# Patient Record
Sex: FEMALE | Race: BLACK OR AFRICAN AMERICAN | NOT HISPANIC OR LATINO | Employment: FULL TIME | URBAN - METROPOLITAN AREA
[De-identification: names, ages, dates, MRNs, and addresses within clinical notes are randomized per-mention and may not be internally consistent; named-entity substitution may affect disease eponyms.]

---

## 2024-06-07 ENCOUNTER — HOSPITAL ENCOUNTER (EMERGENCY)
Facility: HOSPITAL | Age: 24
Discharge: HOME | End: 2024-06-07
Payer: COMMERCIAL

## 2024-06-07 VITALS
RESPIRATION RATE: 16 BRPM | HEART RATE: 75 BPM | SYSTOLIC BLOOD PRESSURE: 129 MMHG | OXYGEN SATURATION: 98 % | DIASTOLIC BLOOD PRESSURE: 66 MMHG | TEMPERATURE: 96.4 F

## 2024-06-07 DIAGNOSIS — K02.9 PAIN DUE TO DENTAL CARIES: Primary | ICD-10-CM

## 2024-06-07 PROCEDURE — 2500000001 HC RX 250 WO HCPCS SELF ADMINISTERED DRUGS (ALT 637 FOR MEDICARE OP)

## 2024-06-07 PROCEDURE — 99283 EMERGENCY DEPT VISIT LOW MDM: CPT

## 2024-06-07 RX ORDER — AMOXICILLIN AND CLAVULANATE POTASSIUM 875; 125 MG/1; MG/1
1 TABLET, FILM COATED ORAL EVERY 12 HOURS
Qty: 20 TABLET | Refills: 0 | Status: SHIPPED | OUTPATIENT
Start: 2024-06-07 | End: 2024-06-17

## 2024-06-07 RX ORDER — AMOXICILLIN AND CLAVULANATE POTASSIUM 875; 125 MG/1; MG/1
1 TABLET, FILM COATED ORAL ONCE
Status: COMPLETED | OUTPATIENT
Start: 2024-06-07 | End: 2024-06-07

## 2024-06-07 RX ORDER — TRAMADOL HYDROCHLORIDE 50 MG/1
50 TABLET ORAL ONCE
Status: COMPLETED | OUTPATIENT
Start: 2024-06-07 | End: 2024-06-07

## 2024-06-07 RX ORDER — TRAMADOL HYDROCHLORIDE 50 MG/1
50 TABLET ORAL EVERY 6 HOURS PRN
Qty: 5 TABLET | Refills: 0 | Status: SHIPPED | OUTPATIENT
Start: 2024-06-07 | End: 2024-06-10

## 2024-06-07 RX ADMIN — TRAMADOL HYDROCHLORIDE 50 MG: 50 TABLET ORAL at 22:49

## 2024-06-07 RX ADMIN — AMOXICILLIN AND CLAVULANATE POTASSIUM 1 TABLET: 875; 125 TABLET, FILM COATED ORAL at 22:49

## 2024-06-07 ASSESSMENT — COLUMBIA-SUICIDE SEVERITY RATING SCALE - C-SSRS
1. IN THE PAST MONTH, HAVE YOU WISHED YOU WERE DEAD OR WISHED YOU COULD GO TO SLEEP AND NOT WAKE UP?: NO
6. HAVE YOU EVER DONE ANYTHING, STARTED TO DO ANYTHING, OR PREPARED TO DO ANYTHING TO END YOUR LIFE?: NO
2. HAVE YOU ACTUALLY HAD ANY THOUGHTS OF KILLING YOURSELF?: NO

## 2024-06-07 ASSESSMENT — LIFESTYLE VARIABLES
HAVE PEOPLE ANNOYED YOU BY CRITICIZING YOUR DRINKING: NO
TOTAL SCORE: 0
HAVE YOU EVER FELT YOU SHOULD CUT DOWN ON YOUR DRINKING: NO
EVER FELT BAD OR GUILTY ABOUT YOUR DRINKING: NO
EVER HAD A DRINK FIRST THING IN THE MORNING TO STEADY YOUR NERVES TO GET RID OF A HANGOVER: NO

## 2024-06-07 ASSESSMENT — PAIN DESCRIPTION - ORIENTATION: ORIENTATION: LEFT;LOWER

## 2024-06-07 ASSESSMENT — PAIN DESCRIPTION - LOCATION: LOCATION: TEETH

## 2024-06-07 ASSESSMENT — PAIN - FUNCTIONAL ASSESSMENT: PAIN_FUNCTIONAL_ASSESSMENT: 0-10

## 2024-06-07 ASSESSMENT — PAIN SCALES - GENERAL
PAINLEVEL_OUTOF10: 10 - WORST POSSIBLE PAIN
PAINLEVEL_OUTOF10: 9

## 2024-06-07 NOTE — Clinical Note
Denise Nassar was seen and treated in our emergency department on 6/7/2024.  She may return to work on 06/10/2024.       If you have any questions or concerns, please don't hesitate to call.      Gerry Cervantes PA-C

## 2024-06-08 NOTE — ED NOTES
Pt c/o left lower dental pain x 2 wks, states initially tolerable and OTC meds were helping with the pain, but today significantly worsened.  D.c. papers and scrips x 2 given. Work note given. Pt advised on dental care, follow up with her dentist, has appointment for 6/18, advised to call in the morning to see if she can get in sooner. Advised on proper way to take medications. Return should symptoms change/worsen in anyway. Gait steady to lobby.      Rafael Black RN  06/07/24 1205

## 2024-06-08 NOTE — ED PROVIDER NOTES
HPI   Chief Complaint   Patient presents with    Dental Pain     Has had pain for the past year. Pain got intense tonight       Patient is a 23-year-old female with no significant past med history presenting with dental pain.  States that she has been suffering from the left mandibular cracked tooth and dental carry for roughly 1 year.  States she has had this evaluated by dentist several weeks ago but she is currently unable to afford the procedures to get this fixed.  States that she has been having pain for several days and today the pain became much more intense.  States she has tried over-the-counter medications without success.  Patient denies fevers, chills, cough, sore throat, runny nose, chest pain, shortness of breath, abdominal pain, nausea, vomiting, diarrhea or urinary complaints.                          Ferris Coma Scale Score: 15                     Patient History   History reviewed. No pertinent past medical history.  History reviewed. No pertinent surgical history.  No family history on file.  Social History     Tobacco Use    Smoking status: Not on file    Smokeless tobacco: Not on file   Substance Use Topics    Alcohol use: Not on file    Drug use: Not on file       Physical Exam   ED Triage Vitals [06/07/24 2206]   Temperature Heart Rate Respirations BP   35.8 °C (96.4 °F) 75 16 129/66      Pulse Ox Temp Source Heart Rate Source Patient Position   98 % Temporal -- --      BP Location FiO2 (%)     Right arm --       Physical Exam  Vitals and nursing note reviewed.   Constitutional:       Appearance: She is well-developed.      Comments: Awake, sitting in examination chair   HENT:      Head: Normocephalic and atraumatic.      Nose: Nose normal.      Mouth/Throat:      Mouth: Mucous membranes are moist.      Comments: Poor dentition noted throughout with posterior left mandibular molar that is cracked in the adjacent tooth with significant dental carry  Eyes:      Extraocular Movements:  Extraocular movements intact.      Conjunctiva/sclera: Conjunctivae normal.      Pupils: Pupils are equal, round, and reactive to light.   Cardiovascular:      Rate and Rhythm: Normal rate and regular rhythm.      Pulses: Normal pulses.      Heart sounds: Normal heart sounds. No murmur heard.  Pulmonary:      Effort: Pulmonary effort is normal. No respiratory distress.      Breath sounds: Normal breath sounds.   Abdominal:      General: Abdomen is flat.      Palpations: Abdomen is soft.      Tenderness: There is no abdominal tenderness.   Musculoskeletal:         General: No swelling. Normal range of motion.      Cervical back: Normal range of motion and neck supple.   Skin:     General: Skin is warm and dry.      Capillary Refill: Capillary refill takes less than 2 seconds.   Neurological:      General: No focal deficit present.      Mental Status: She is alert and oriented to person, place, and time.   Psychiatric:         Mood and Affect: Mood normal.         Behavior: Behavior normal.         ED Course & MDM   Diagnoses as of 06/08/24 1020   Pain due to dental caries       Medical Decision Making  Patient is a 23-year-old female with no significant past medical history presenting with dental pain.  Conditions considered include but not limited to: Dental trauma, dental carry, tooth fracture, abscess, PTA, Miguelito angina.  PTA less likely due to no unilateral swelling, no stridor, no unilateral deviation of the trachea.  But again she is unlikely due to no dysphagia, no submandibular swelling, no respiratory compromise.    After physical exam there was noted to be multiple dental caries as well as left mandibular molar to be cracked.  Low concern for possible infection, antibiotic started.  Tramadol given for pain.  Discussed with this patient that she needs to follow-up with a dentist.  We did give on-call follow-up with dentistry as well as recommend she follow-up with her primary dentist.  I believe this  patient is at low risk for complication, and a disoposition of discharge is acceptable.  Return to the Emergency Department if new or worsening symptoms including headache, fever, chills, chest pain, shortness of breath, syncope, near syncope, abdominal pain, nausea, vomiting,  diarrhea, or worsening pain.  Patient will be sent home on antibiotics as well as a small dose of tramadol for pain control.  Patient is agreeable to disposition of discharge with follow-up with primary dentistry and primary care.    Portions of this note made with Dragon software, please be mindful of potential grammatical errors.        Medications   amoxicillin-pot clavulanate (Augmentin) 875-125 mg per tablet 1 tablet (1 tablet oral Given 6/7/24 2249)   traMADol (Ultram) tablet 50 mg (50 mg oral Given 6/7/24 2249)         Procedure  Procedures     Gerry Cervantes PA-C  06/08/24 1028

## 2025-05-12 ENCOUNTER — HOSPITAL ENCOUNTER (EMERGENCY)
Facility: HOSPITAL | Age: 25
Discharge: HOME | End: 2025-05-12
Payer: COMMERCIAL

## 2025-05-12 ENCOUNTER — APPOINTMENT (OUTPATIENT)
Dept: RADIOLOGY | Facility: HOSPITAL | Age: 25
End: 2025-05-12
Payer: COMMERCIAL

## 2025-05-12 VITALS
BODY MASS INDEX: 37.89 KG/M2 | DIASTOLIC BLOOD PRESSURE: 71 MMHG | WEIGHT: 250 LBS | TEMPERATURE: 97.7 F | OXYGEN SATURATION: 96 % | HEART RATE: 99 BPM | SYSTOLIC BLOOD PRESSURE: 134 MMHG | RESPIRATION RATE: 19 BRPM | HEIGHT: 68 IN
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
## 2025-05-12 PROCEDURE — 2500000001 HC RX 250 WO HCPCS SELF ADMINISTERED DRUGS (ALT 637 FOR MEDICARE OP): Performed by: NURSE PRACTITIONER

## 2025-05-12 PROCEDURE — 90471 IMMUNIZATION ADMIN: CPT | Performed by: NURSE PRACTITIONER

## 2025-05-12 PROCEDURE — 12002 RPR S/N/AX/GEN/TRNK2.6-7.5CM: CPT

## 2025-05-12 PROCEDURE — 90715 TDAP VACCINE 7 YRS/> IM: CPT | Mod: JZ | Performed by: NURSE PRACTITIONER

## 2025-05-12 PROCEDURE — 73090 X-RAY EXAM OF FOREARM: CPT | Mod: RT

## 2025-05-12 PROCEDURE — 2500000004 HC RX 250 GENERAL PHARMACY W/ HCPCS (ALT 636 FOR OP/ED): Performed by: NURSE PRACTITIONER

## 2025-05-12 PROCEDURE — 99283 EMERGENCY DEPT VISIT LOW MDM: CPT | Mod: 25

## 2025-05-12 RX ORDER — LIDOCAINE HYDROCHLORIDE AND EPINEPHRINE 10; 10 UG/ML; MG/ML
10 INJECTION, SOLUTION INFILTRATION; PERINEURAL ONCE
Status: COMPLETED | OUTPATIENT
Start: 2025-05-12 | End: 2025-05-12

## 2025-05-12 RX ORDER — AMOXICILLIN AND CLAVULANATE POTASSIUM 875; 125 MG/1; MG/1
1 TABLET, FILM COATED ORAL ONCE
Status: COMPLETED | OUTPATIENT
Start: 2025-05-12 | End: 2025-05-12

## 2025-05-12 RX ORDER — AMOXICILLIN AND CLAVULANATE POTASSIUM 875; 125 MG/1; MG/1
875 TABLET, FILM COATED ORAL EVERY 12 HOURS
Qty: 14 TABLET | Refills: 0 | Status: SHIPPED | OUTPATIENT
Start: 2025-05-12 | End: 2025-05-19

## 2025-05-12 RX ADMIN — AMOXICILLIN AND CLAVULANATE POTASSIUM 1 TABLET: 875; 125 TABLET, COATED ORAL at 22:04

## 2025-05-12 RX ADMIN — TETANUS TOXOID, REDUCED DIPHTHERIA TOXOID AND ACELLULAR PERTUSSIS VACCINE, ADSORBED 0.5 ML: 5; 2.5; 8; 8; 2.5 SUSPENSION INTRAMUSCULAR at 22:04

## 2025-05-12 RX ADMIN — LIDOCAINE HYDROCHLORIDE AND EPINEPHRINE 10 ML: 10; 10 INJECTION, SOLUTION INFILTRATION; PERINEURAL at 22:01

## 2025-05-12 ASSESSMENT — PAIN DESCRIPTION - PAIN TYPE: TYPE: ACUTE PAIN

## 2025-05-12 ASSESSMENT — COLUMBIA-SUICIDE SEVERITY RATING SCALE - C-SSRS
6. HAVE YOU EVER DONE ANYTHING, STARTED TO DO ANYTHING, OR PREPARED TO DO ANYTHING TO END YOUR LIFE?: NO
1. IN THE PAST MONTH, HAVE YOU WISHED YOU WERE DEAD OR WISHED YOU COULD GO TO SLEEP AND NOT WAKE UP?: NO
2. HAVE YOU ACTUALLY HAD ANY THOUGHTS OF KILLING YOURSELF?: NO

## 2025-05-12 ASSESSMENT — PAIN - FUNCTIONAL ASSESSMENT: PAIN_FUNCTIONAL_ASSESSMENT: 0-10

## 2025-05-12 ASSESSMENT — PAIN DESCRIPTION - ORIENTATION: ORIENTATION: LEFT

## 2025-05-12 ASSESSMENT — PAIN SCALES - GENERAL: PAINLEVEL_OUTOF10: 7

## 2025-05-12 ASSESSMENT — PAIN DESCRIPTION - LOCATION: LOCATION: ARM

## 2025-05-12 NOTE — LETTER
May 12, 2025    Patient: Denise Nassar   YOB: 2000   Date of Visit: 5/12/2025       To Whom It May Concern:    Denise Nassar was seen and treated in our emergency department on 5/12/2025. She may return to work 5/15.     If you have any questions or concerns, please don't hesitate to call.              CC: No Recipients

## 2025-05-13 NOTE — ED TRIAGE NOTES
Pt c/o dog bite to right forearm about 1 hour pta. She reports she was at a dog park and 3 dogs were involved, hers, another persons and then a dog that her friend found as a stray and took it in about 1 day ago. Unknown history on the stray dog.     No active bleeding. Bite lennox is linear and adipose tissue exposed. Wound cleansed in triage with soap and water. New 4x4 dressing applied. ACE wrap applied.     Last tetanus 2013.

## 2025-05-13 NOTE — DISCHARGE INSTRUCTIONS
Please return to the Emergency Department immediately if new or worsening symptoms occur. Symptoms of that are most concerning include (e.g., changing or worsening pain, fever, numbness, weakness, cool or painful digits) that necessitate immediate return.     Sutures will need to be removed in 10 to 14 days.    Thank you for allowing us to take care of you today. While you are home, you might receive a survey about your care in our hospital. Your nurse and myself, Hunter Adair CNP, would love your honest feedback on your care. Your feedback and especially your positive comments help our hospital receive the support we need to continue to serve you and your family. Thank you again for trusting us with your care.

## 2025-05-13 NOTE — ED PROCEDURE NOTE
Procedure  Procedures    Laceration Repair    Procedure: Laceration Repair    The procedure was performed by myself.    Risks and Benefits: Risks, benefits and alternatives were discussed.    Questions were sought and answered, and consent was obtained.    Wound Location: Right forearm    Wound Length (cm): 3.2    Foreign bodies/visible debris/heavy contamination prior to cleaning: none    Deep structure exposure (bone, tendon, neurovascular structure): [[No]    Involvement of vermillion border, nostril rim, helical rim: [No]    Anesthesia Type/Quantity: 8 cc 1% lidocaine with epinephrine    Wound Preparation: Patient was prepped and draped in the usual sterile fashion.    Cleaning: [ [Extensive]    Irrigation/Cleaning Solution and/or Method: Betasept, normal saline    The wound was explored to its base in a bloodless field.    Wound edge debridement: [No]    Undermining: [None]     Number of Layers in Closure: 1    Procedure Description (suture type, number, technique): 2 sutures of 4-0 Ethilon interrupted loosely approximating the wound    Patient tolerated the procedure well with no immediate complications and close approximation.         GUERO Whitfield-CNP  05/12/25 [id number]

## 2025-05-26 ENCOUNTER — HOSPITAL ENCOUNTER (EMERGENCY)
Facility: HOSPITAL | Age: 25
Discharge: HOME | End: 2025-05-26
Payer: COMMERCIAL

## 2025-05-26 VITALS
TEMPERATURE: 97.3 F | DIASTOLIC BLOOD PRESSURE: 154 MMHG | HEIGHT: 64 IN | SYSTOLIC BLOOD PRESSURE: 186 MMHG | OXYGEN SATURATION: 93 % | HEART RATE: 90 BPM | RESPIRATION RATE: 18 BRPM | BODY MASS INDEX: 42.68 KG/M2 | WEIGHT: 250 LBS
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
## 2025-05-26 PROCEDURE — 99281 EMR DPT VST MAYX REQ PHY/QHP: CPT

## 2025-05-26 ASSESSMENT — LIFESTYLE VARIABLES
EVER FELT BAD OR GUILTY ABOUT YOUR DRINKING: NO
EVER HAD A DRINK FIRST THING IN THE MORNING TO STEADY YOUR NERVES TO GET RID OF A HANGOVER: NO
TOTAL SCORE: 0
HAVE YOU EVER FELT YOU SHOULD CUT DOWN ON YOUR DRINKING: NO
HAVE PEOPLE ANNOYED YOU BY CRITICIZING YOUR DRINKING: NO

## 2025-05-26 ASSESSMENT — PAIN SCALES - GENERAL
PAINLEVEL_OUTOF10: 0 - NO PAIN
PAINLEVEL_OUTOF10: 0 - NO PAIN

## 2025-05-26 ASSESSMENT — PAIN DESCRIPTION - PROGRESSION: CLINICAL_PROGRESSION: NOT CHANGED

## 2025-05-26 ASSESSMENT — PAIN - FUNCTIONAL ASSESSMENT: PAIN_FUNCTIONAL_ASSESSMENT: 0-10

## 2025-05-26 NOTE — ED PROVIDER NOTES
HPI   Chief Complaint   Patient presents with    Suture / Staple Removal     Presents for suture removal to R arm x 2 sutures       HPI  See my MDM      Patient History   Medical History[1]  Surgical History[2]  Family History[3]  Social History[4]    Physical Exam   ED Triage Vitals [05/26/25 1519]   Temperature Heart Rate Respirations BP   36.3 °C (97.3 °F) 90 18 (!) 186/154      Pulse Ox Temp Source Heart Rate Source Patient Position   (!) 93 % Temporal Monitor Sitting      BP Location FiO2 (%)     Left arm --       Physical Exam  CONSTITUTIONAL: Vital signs reviewed as charted, well-developed and in no distress  LUNGS: Breath sounds equal and clear to auscultation. Good air exchange, no wheezes rales or retractions, pulse oximetry is charted.  HEART: Regular rate and rhythm without murmur thrill or rub, strong tones, auscultation is normal.  ABDOMEN: Soft and nontender without guarding rebound rigidity or mass. Bowel sounds are present and normal in all quadrants. There is no palpable masses or aneurysms identified. No hepatosplenomegaly, normal abdominal exam.  Neuro: The patient is awake, alert and oriented ×3. Moving all 4 extremities and answering questions appropriately.   MUSCULOSKELETAL: The calves are nontender to palpation. Full gross active range of motion.   PSYCH: Awake alert oriented, normal mood and affect.  Skin:  Dry, normal color, warm to the touch, no rash present.  Exam of the right arm shows a well-healed healing wound, scab still present.  1 suture in place.      ED Course & MDM   Diagnoses as of 05/26/25 1531   Dog bite, subsequent encounter                 No data recorded     Talib Coma Scale Score: 15 (05/26/25 1518 : Brian Paladino, RN)                           Medical Decision Making  History obtained from: patient    Vital signs, nursing notes, current medications, past medical history, Surgical history, allergies, social history, family History were reviewed.         HPI:    Patient 24-year-old female presenting emergency room today for suture removal.  I personally placed sutures 2 weeks ago for a dog bite.  One of them did fall out in the last couple of days at home.  The wound is well-approximated, there is a scab present.  There is no evidence of infection.  No drainage.  Nontoxic and well-appearing      10 point ROS was reviewed and negative except Noted above in HPI.  DDX: as listed above          MDM Summary/considerations:  Labs Reviewed - No data to display  No orders to display     Medications - No data to display  New Prescriptions    No medications on file     I estimate there is LOW risk for CELLULITIS, COMPARTMENT SYNDROME, NECROTIZING FASCIITIS, TENDON OR NEUROVASCULAR INJURY, or FOREIGN BODY, thus I consider the discharge disposition reasonable. Also, there is no evidence for peritonitis, sepsis, or toxicity. We have discussed the diagnosis and risks, and we agree with discharging home to follow-up with their primary doctor. We also discussed returning to the Emergency Department immediately if new or worsening symptoms occur. We have discussed the symptoms which are most concerning (e.g., changing or worsening pain, fever, numbness, weakness, cool or painful digits) that necessitate immediate return.     1 sutures removed by myself, patient discharged home stable condition will follow PCP for reevaluation.  Wound showed no evidence of infection.    All of the patient's questions were answered to the best of my ability.  Patient states understanding that they have been screened for an emergency today and we have not found any etiology of symptoms that requires emergent treatment or admission to the hospital at this point. They understand that they have not had definitive care day and require follow-up for treatment of their condition. They also state understanding that they may have an emergent condition that may potentially have not of detected at this visit and they  must return to the emergency department if they develop any worsening of symptoms or new complaints.      I have evaluated this patient, my supervising physician was available for consultation.              Critical Care:Not warranted at this time        This chart was completed using voice recognition transcription software. Please excuse any errors of transcription including grammatical, punctuation, syntax and spelling errors.  Please contact me with any questions regarding this chart.     Procedure  Procedures       [1] No past medical history on file.  [2] No past surgical history on file.  [3] No family history on file.  [4]   Social History  Tobacco Use    Smoking status: Not on file    Smokeless tobacco: Not on file   Substance Use Topics    Alcohol use: Not on file    Drug use: Not on file        GUERO Whitfield-CNP  05/26/25 153

## 2025-05-26 NOTE — ED PROCEDURE NOTE
Procedure  Suture Removal    Performed by: MONI Whitfield  Authorized by: MONI Whitfield    Consent:     Consent obtained:  Verbal    Consent given by:  Patient    Risks discussed:  Bleeding, pain and wound separation    Alternatives discussed:  No treatment  Universal protocol:     Patient identity confirmed:  Verbally with patient  Location:     Location:  Upper extremity    Upper extremity location:  Arm    Arm location:  R lower arm  Procedure details:     Wound appearance:  No signs of infection, good wound healing and clean    Number of sutures removed:  1 (Patient had 1 suture fell out at home)  Post-procedure details:     Post-removal:  No dressing applied    Procedure completion:  Tolerated               MONI Whitfield  05/26/25 1529

## 2025-07-09 ENCOUNTER — HOSPITAL ENCOUNTER (EMERGENCY)
Facility: HOSPITAL | Age: 25
Discharge: HOME | End: 2025-07-09
Payer: COMMERCIAL

## 2025-07-09 VITALS
BODY MASS INDEX: 44.05 KG/M2 | WEIGHT: 258 LBS | HEART RATE: 82 BPM | SYSTOLIC BLOOD PRESSURE: 103 MMHG | RESPIRATION RATE: 18 BRPM | DIASTOLIC BLOOD PRESSURE: 71 MMHG | OXYGEN SATURATION: 99 % | TEMPERATURE: 97.5 F | HEIGHT: 64 IN

## 2025-07-09 DIAGNOSIS — J02.0 STREP PHARYNGITIS: Primary | ICD-10-CM

## 2025-07-09 LAB — S PYO DNA THROAT QL NAA+PROBE: DETECTED

## 2025-07-09 PROCEDURE — 99283 EMERGENCY DEPT VISIT LOW MDM: CPT

## 2025-07-09 PROCEDURE — 2500000001 HC RX 250 WO HCPCS SELF ADMINISTERED DRUGS (ALT 637 FOR MEDICARE OP): Performed by: NURSE PRACTITIONER

## 2025-07-09 PROCEDURE — 87651 STREP A DNA AMP PROBE: CPT | Performed by: NURSE PRACTITIONER

## 2025-07-09 RX ORDER — AMOXICILLIN 500 MG/1
500 CAPSULE ORAL ONCE
Status: COMPLETED | OUTPATIENT
Start: 2025-07-09 | End: 2025-07-09

## 2025-07-09 RX ORDER — AMOXICILLIN 500 MG/1
500 CAPSULE ORAL 2 TIMES DAILY
Qty: 14 CAPSULE | Refills: 0 | Status: SHIPPED | OUTPATIENT
Start: 2025-07-09 | End: 2025-07-16

## 2025-07-09 RX ADMIN — AMOXICILLIN 500 MG: 500 CAPSULE ORAL at 19:31

## 2025-07-09 ASSESSMENT — PAIN SCALES - GENERAL
PAINLEVEL_OUTOF10: 7
PAINLEVEL_OUTOF10: 7

## 2025-07-09 ASSESSMENT — PAIN DESCRIPTION - PROGRESSION: CLINICAL_PROGRESSION: NOT CHANGED

## 2025-07-09 ASSESSMENT — PAIN DESCRIPTION - PAIN TYPE: TYPE: ACUTE PAIN

## 2025-07-09 ASSESSMENT — PAIN DESCRIPTION - LOCATION: LOCATION: THROAT

## 2025-07-09 ASSESSMENT — PAIN - FUNCTIONAL ASSESSMENT: PAIN_FUNCTIONAL_ASSESSMENT: 0-10

## 2025-07-09 NOTE — DISCHARGE INSTRUCTIONS
Please return to the Emergency Department immediately if new or worsening symptoms occur. Symptoms that are most concerning include (e.g., changing or worsening pain, trouble swallowing or breathing, neck stiffness, fever) that necessitate immediate return.     Thank you for allowing us to take care of you today. While you are home, you might receive a survey about your care in our hospital. Your nurse and myself, Hunter Adair CNP, would love your honest feedback on your care. Your feedback and especially your positive comments help our hospital receive the support we need to continue to serve you and your family. Thank you again for trusting us with your care.

## 2025-07-09 NOTE — ED PROVIDER NOTES
HPI   Chief Complaint   Patient presents with    Sore Throat       HPI  See my MDM      Patient History   Medical History[1]  Surgical History[2]  Family History[3]  Social History[4]    Physical Exam   ED Triage Vitals [07/09/25 1842]   Temperature Heart Rate Respirations BP   36.4 °C (97.5 °F) 82 18 103/71      Pulse Ox Temp Source Heart Rate Source Patient Position   99 % Temporal Monitor Sitting      BP Location FiO2 (%)     Left arm --       Physical Exam  CONSTITUTIONAL: Vital signs reviewed as charted, well-developed and in no distress  Eyes: Extraocular muscles are intact. Pupils equal round and reactive to light. Conjunctiva are pink.    ENT: Mucous membranes are moist. Tongue in the midline. Pharynx with erythema and edema uvula midline  LUNGS: Breath sounds equal and clear to auscultation. Good air exchange, no wheezes rales or retractions, pulse oximetry is charted.  HEART: Regular rate and rhythm without murmur thrill or rub, strong tones, auscultation is normal.  ABDOMEN: Soft and nontender without guarding rebound rigidity or mass. Bowel sounds are present and normal in all quadrants. There is no palpable masses or aneurysms identified. No hepatosplenomegaly, normal abdominal exam.  Neuro: The patient is awake, alert and oriented ×3. Moving all 4 extremities and answering questions appropriately.   MUSCULOSKELETAL: The calves are nontender to palpation. Full gross active range of motion.   PSYCH: Awake alert oriented, normal mood and affect.  Skin:  Dry, normal color, warm to the touch, no rash present.        ED Course & MDM   Diagnoses as of 07/09/25 1931   Strep pharyngitis                 No data recorded                                 Medical Decision Making  History obtained from: patient    Vital signs, nursing notes, current medications, past medical history, Surgical history, allergies, social history, family History were reviewed.         HPI:  Patient 25-year-old female present emergency  room today with 3-day history of sore throat.  Denies fever chills or night sweats.  Denies nausea vomiting diarrhea.  States it feels like it is burning when she tries to swallow something.  States it feels like she had strep in the past.  She denies dizziness, chest pain, shortness of breath, abdominal pain extremity edema denies difficulty swallowing speaking or breathing.  Nontoxic well-appearing vital signs within normal limits      10 point ROS was reviewed and negative except Noted above in HPI.  DDX: as listed above          MDM Summary/considerations:  Labs Reviewed   GROUP A STREPTOCOCCUS, PCR - Abnormal       Result Value    Group A Strep PCR Detected (*)      No orders to display     Medications   amoxicillin (Amoxil) capsule 500 mg (has no administration in time range)     New Prescriptions    AMOXICILLIN (AMOXIL) 500 MG CAPSULE    Take 1 capsule (500 mg) by mouth 2 times a day for 7 days.     I estimate there is LOW risk for EPIGLOTTITIS, PNEUMONIA, MENINGITIS, OR URINARY TRACT INFECTION, thus I consider the discharge disposition reasonable. Also, there is no evidence for peritonitis, sepsis, or toxicity. We have discussed the diagnosis and risks, and we agree with discharging home to follow-up with their primary doctor. We also discussed returning to the Emergency Department immediately if new or worsening symptoms occur. We have discussed the symptoms which are most concerning (e.g., changing or worsening pain, trouble swallowing or breathing, neck stiffness, fever) that necessitate immediate return.    Patient strep positive will start on antibiotics first dose given here in the ED.  Symptomatic treatment was discussed with the patient will follow with her PCP for reevaluation.    All of the patient's questions were answered to the best of my ability.  Patient states understanding that they have been screened for an emergency today and we have not found any etiology of symptoms that requires  emergent treatment or admission to the hospital at this point. They understand that they have not had definitive care day and require follow-up for treatment of their condition. They also state understanding that they may have an emergent condition that may potentially have not of detected at this visit and they must return to the emergency department if they develop any worsening of symptoms or new complaints.      I have evaluated this patient, my supervising physician was available for consultation.              Critical Care: Not warranted at this time        This chart was completed using voice recognition transcription software. Please excuse any errors of transcription including grammatical, punctuation, syntax and spelling errors.  Please contact me with any questions regarding this chart.    Procedure  Procedures       [1] No past medical history on file.  [2] No past surgical history on file.  [3] No family history on file.  [4]   Social History  Tobacco Use    Smoking status: Not on file    Smokeless tobacco: Not on file   Substance Use Topics    Alcohol use: Not on file    Drug use: Not on file        GUERO Whitfield-CNP  07/09/25 1937